# Patient Record
Sex: MALE | Race: OTHER | NOT HISPANIC OR LATINO | Employment: OTHER
[De-identification: names, ages, dates, MRNs, and addresses within clinical notes are randomized per-mention and may not be internally consistent; named-entity substitution may affect disease eponyms.]

---

## 2018-01-02 ENCOUNTER — NEW PATIENT COMPREHENSIVE (OUTPATIENT)
Age: 48
End: 2018-01-02

## 2018-01-02 DIAGNOSIS — H52.12: ICD-10-CM

## 2018-01-02 DIAGNOSIS — H52.203: ICD-10-CM

## 2018-01-02 PROCEDURE — 92004 COMPRE OPH EXAM NEW PT 1/>: CPT

## 2018-01-02 PROCEDURE — 92015 DETERMINE REFRACTIVE STATE: CPT

## 2018-01-02 ASSESSMENT — TONOMETRY
OD_IOP_MMHG: 16
OS_IOP_MMHG: 18

## 2018-01-02 ASSESSMENT — KERATOMETRY
OD_AXISANGLE_DEGREES: 163
OS_AXISANGLE2_DEGREES: 95
OD_AXISANGLE2_DEGREES: 73
OD_K2POWER_DIOPTERS: 43.00
OS_AXISANGLE_DEGREES: 5
OD_K1POWER_DIOPTERS: 42.50
OS_K1POWER_DIOPTERS: 42.00
OS_K2POWER_DIOPTERS: 42.75

## 2018-01-02 ASSESSMENT — VISUAL ACUITY
OD_SC: 20/50
OD_SC: J2
OS_SC: 20/40
OS_SC: J3

## 2021-10-08 NOTE — PATIENT DISCUSSION
Patient reassured that normal eye pressure and ocular health OU today. Recommend follow up with PCP if continues.